# Patient Record
Sex: MALE | Race: WHITE | NOT HISPANIC OR LATINO | URBAN - METROPOLITAN AREA
[De-identification: names, ages, dates, MRNs, and addresses within clinical notes are randomized per-mention and may not be internally consistent; named-entity substitution may affect disease eponyms.]

---

## 2023-08-17 ENCOUNTER — OFFICE VISIT (OUTPATIENT)
Dept: URGENT CARE | Facility: URGENT CARE | Age: 28
End: 2023-08-17
Payer: COMMERCIAL

## 2023-08-17 VITALS
DIASTOLIC BLOOD PRESSURE: 87 MMHG | SYSTOLIC BLOOD PRESSURE: 129 MMHG | HEART RATE: 72 BPM | OXYGEN SATURATION: 99 % | RESPIRATION RATE: 17 BRPM | WEIGHT: 140 LBS | TEMPERATURE: 97.6 F

## 2023-08-17 DIAGNOSIS — S93.491A SPRAIN OF ANTERIOR TALOFIBULAR LIGAMENT, RIGHT, INITIAL ENCOUNTER: Primary | ICD-10-CM

## 2023-08-17 PROCEDURE — 99203 OFFICE O/P NEW LOW 30 MIN: CPT

## 2023-08-17 NOTE — PATIENT INSTRUCTIONS
Rest, ice and elevate the affected extremity.  Activity as tolerated.  Can use Tylenol and/or ibuprofen as needed for pain.  Maximum dose of Tylenol is 4000mg in a 24 hour period of time.  Take ibuprofen with food to avoid stomach upset.  You can also draw ABCs using an imaginary pencil as your big toe.  Follow up with your primary care provider or orthopedics should symptoms persist after 2 weeks.

## 2023-08-17 NOTE — PROGRESS NOTES
ASSESSMENT:  (S98.519U) Sprain of anterior talofibular ligament, right, initial encounter  (primary encounter diagnosis)    PLAN:  Informed the patient to rest, ice and elevate the affected extremity.  We discussed the need to perform activity as tolerated.  We also discussed using Tylenol and or ibuprofen as needed for pain with the max dose Tylenol being 4000 mg in 24-hour period of time and to take ibuprofen with food avoid upset stomach.  Instructed the patient to dry ABCs using an imaginary pencil as his big toe.  We also discussed following up with his primary care provider or orthopedics should symptoms persist after 2 weeks.  Patient acknowledged his understanding of the above plan.    The use of [employer] services may have been used to construct the content in this note; any grammatical or spelling errors are non-intentional. Please contact the author of this note directly if you are in need of any clarification.      Sai Mercado, GEMMA CNP    SUBJECTIVE:  Raheem Rome is a 28 year old male who presents today for right ankle pain.   Injury occurred 3 days ago.    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  He also notes that he was wearing a different type of boot while he was walking and standing for work.  Quality: tightness and rates it as 6/10 at its worst  What makes it worse: standing and worse in the morning  What makes it better:Tylenol  Associated Signs/ Symptoms: none  Treatment: Tylenol  History of recurrent ankle injuries: no  Symptoms are improving  Denies any numbness/tingling.    ROS:  Negative except noted above.       OBJECTIVE:  Blood pressure 129/87, pulse 72, temperature 97.6  F (36.4  C), temperature source Tympanic, resp. rate 17, weight 63.5 kg (140 lb), SpO2 99 %.  Patient is alert and not in apparent  distress.  Ankle Exam (right):  Inspection:  No visible ecchymosis.  No visible edema or effusion.  No obvious deformity noted in foot and ankle.  Palpation:  Tender to palpation over anterior talofibular ligament.  Good doralis pedis and posterior tibial pulses  Neurovascularly Intact Distally.   ROM:  Full range of motion with dorsiflexion, plantarflexion, inversion and eversion but pain reported with plantarflexion and inversion.  Gait normal.

## 2023-10-22 ENCOUNTER — HEALTH MAINTENANCE LETTER (OUTPATIENT)
Age: 28
End: 2023-10-22

## 2024-12-15 ENCOUNTER — HEALTH MAINTENANCE LETTER (OUTPATIENT)
Age: 29
End: 2024-12-15